# Patient Record
Sex: MALE | Race: WHITE | Employment: OTHER | URBAN - METROPOLITAN AREA
[De-identification: names, ages, dates, MRNs, and addresses within clinical notes are randomized per-mention and may not be internally consistent; named-entity substitution may affect disease eponyms.]

---

## 2018-01-15 NOTE — MISCELLANEOUS
Message    Date: 05/16/2016 03:15 PM  Pt called to say he is on Terbinafine 250 mg 1 po qd for toe fungus and has labs done recently  Pt states he lived in [place], [place] and told to call to see how his liver function is  He states the toe fungus is much better and almost cleared- should he continue med and for how long?   17 May 2016 12:21 PM   LM for patient with info that liver function is good and he should continue Terbinafine for another 2 to 3 months per Dr Trish Pierce instructions  I stated if he has any questions he should call us back  VR/vfp        Active Problems    1  Anxiety (300 00) (F41 9)   2  Colonoscopy (Fiberoptic) Screening   3  Decreased libido (799 81) (R68 82)   4  Depression (311) (F32 9)   5  Encounter for screening colonoscopy (V76 51) (Z12 11)   6  Essential hypertriglyceridemia (272 1) (E78 1)   7  Hypercholesterolemia (272 0) (E78 0)   8  Laceration of skin (879 8) (T14 8)   9  Obesity (278 00) (E66 9)   10  Onychomycosis (110 1) (B35 1)   11  Screening for colorectal cancer (V76 51) (Z12 11,Z12 12)   12  Screening PSA (prostate specific antigen) (V76 44) (Z12 5)    Current Meds   1  B-12 100 MCG Oral Tablet; Therapy: 66EIF3249 to Recorded   2  Clotrimazole-Betamethasone 1-0 05 % External Cream; APPLY  AND RUB  IN A THIN   FILM TO AFFECTED AREAS TWICE DAILY  (AM AND PM); Therapy: 71PTP6114 to (Last Rx:08Jun2015)  Requested for: 81LUI8848 Ordered   3  Fish Oil 1200 MG Oral Capsule; Therapy: 53LUV1714 to Recorded   4  Flax Oral Oil; Therapy: 58KBR5245 to Recorded   5  Multivitamins TABS; Therapy: 15FNK7721 to Recorded   6  Nystatin 253672 UNIT/GM External Cream; APPLY 2-3 TIMES DAILY TO AFFECTED   AREA(S); Therapy: 11RIH3996 to (Last Rx:08Jun2015)  Requested for: 33CUV5043 Ordered   7  Nystatin 669458 UNIT/GM External Cream; APPLY 2-3 TIMES DAILY TO AFFECTED   AREA(S); Therapy: 44UXE9993 to (Last Rx:08Jun2015)  Requested for: 38EZW8983 Ordered   8   Sertraline HCl - 50 MG Oral Tablet; TAKE 1 TABLET DAILY; Therapy: 23WWD6951 to (Evaluate:11Alv8920)  Requested for: 90KCI2942; Last   Rx:11Jan2016 Ordered   9  Sertraline HCl - 50 MG Oral Tablet; take 1 tablet every twelve hours; Therapy: 61FEA9070 to (Evaluate:20Lrf7640)  Requested for: 59XMV3639; Last   Rx:01Feb2016 Ordered   10  Silver Sulfadiazine 1 % External Cream; APPLY 1 INCH Daily; Therapy: 07EXL9494 to (Last Rx:11Jan2016)  Requested for: 83RPN0928 Ordered   11  Terbinafine HCl - 250 MG Oral Tablet; Take 1 tablet daily; Therapy: 15KYK3054 to (Danna Thurman)  Requested for: 13WAL3642; Last    Rx:01Feb2016 Ordered   12  Triamcinolone Acetonide 0 5 % External Cream; APPLY 2-3 TIMES DAILY TO    AFFECTED AREA(S); Therapy: 84CWT2012 to (Last Rx:80Ytf5046)  Requested for: 96ISI7335 Ordered   13  Viagra 100 MG Oral Tablet; TAKE 1 TABLET DAILY 1 HOUR BEFORE NEEDED; Therapy: 13XSD8538 to (Evaluate:56Jbz8856)  Requested for: 64NSU4056; Last    Rx:11Jan2016 Ordered   14  Vitamin C 500 MG Oral Tablet Recorded    Allergies    1   No Known Drug Allergies    Signatures   Electronically signed by : Shemar Reddy DO; May 17 2016  2:02PM EST                       (Author)

## 2021-04-19 DIAGNOSIS — E55.9 VITAMIN D DEFICIENCY: ICD-10-CM

## 2021-04-19 DIAGNOSIS — E78.00 HYPERCHOLESTEREMIA: ICD-10-CM

## 2021-04-19 DIAGNOSIS — Z12.5 PROSTATE CANCER SCREENING: Primary | ICD-10-CM

## 2021-04-21 LAB
25(OH)D3 SERPL-MCNC: 11 NG/ML (ref 30–100)
ALBUMIN SERPL-MCNC: 4.5 G/DL (ref 3.6–5.1)
ALBUMIN/GLOB SERPL: 1.6 (CALC) (ref 1–2.5)
ALP SERPL-CCNC: 89 U/L (ref 35–144)
ALT SERPL-CCNC: 36 U/L (ref 9–46)
AST SERPL-CCNC: 27 U/L (ref 10–35)
BASOPHILS # BLD AUTO: 50 CELLS/UL (ref 0–200)
BASOPHILS NFR BLD AUTO: 0.8 %
BILIRUB SERPL-MCNC: 0.9 MG/DL (ref 0.2–1.2)
BUN SERPL-MCNC: 23 MG/DL (ref 7–25)
BUN/CREAT SERPL: NORMAL (CALC) (ref 6–22)
CALCIUM SERPL-MCNC: 9.1 MG/DL (ref 8.6–10.3)
CHLORIDE SERPL-SCNC: 102 MMOL/L (ref 98–110)
CHOLEST SERPL-MCNC: 274 MG/DL
CHOLEST/HDLC SERPL: 4.1 (CALC)
CO2 SERPL-SCNC: 25 MMOL/L (ref 20–32)
CREAT SERPL-MCNC: 1.12 MG/DL (ref 0.7–1.25)
EOSINOPHIL # BLD AUTO: 112 CELLS/UL (ref 15–500)
EOSINOPHIL NFR BLD AUTO: 1.8 %
ERYTHROCYTE [DISTWIDTH] IN BLOOD BY AUTOMATED COUNT: 12.6 % (ref 11–15)
GLOBULIN SER CALC-MCNC: 2.8 G/DL (CALC) (ref 1.9–3.7)
GLUCOSE SERPL-MCNC: 98 MG/DL (ref 65–99)
HCT VFR BLD AUTO: 44.9 % (ref 38.5–50)
HDLC SERPL-MCNC: 67 MG/DL
HGB BLD-MCNC: 15.3 G/DL (ref 13.2–17.1)
LDLC SERPL CALC-MCNC: 185 MG/DL (CALC)
LYMPHOCYTES # BLD AUTO: 1265 CELLS/UL (ref 850–3900)
LYMPHOCYTES NFR BLD AUTO: 20.4 %
MCH RBC QN AUTO: 30.7 PG (ref 27–33)
MCHC RBC AUTO-ENTMCNC: 34.1 G/DL (ref 32–36)
MCV RBC AUTO: 90.2 FL (ref 80–100)
MONOCYTES # BLD AUTO: 508 CELLS/UL (ref 200–950)
MONOCYTES NFR BLD AUTO: 8.2 %
NEUTROPHILS # BLD AUTO: 4266 CELLS/UL (ref 1500–7800)
NEUTROPHILS NFR BLD AUTO: 68.8 %
NONHDLC SERPL-MCNC: 207 MG/DL (CALC)
PLATELET # BLD AUTO: 242 THOUSAND/UL (ref 140–400)
PMV BLD REES-ECKER: 10.4 FL (ref 7.5–12.5)
POTASSIUM SERPL-SCNC: 4.2 MMOL/L (ref 3.5–5.3)
PROT SERPL-MCNC: 7.3 G/DL (ref 6.1–8.1)
PSA SERPL-MCNC: 1.4 NG/ML
RBC # BLD AUTO: 4.98 MILLION/UL (ref 4.2–5.8)
SL AMB EGFR AFRICAN AMERICAN: 82 ML/MIN/1.73M2
SL AMB EGFR NON AFRICAN AMERICAN: 71 ML/MIN/1.73M2
SODIUM SERPL-SCNC: 139 MMOL/L (ref 135–146)
TRIGL SERPL-MCNC: 101 MG/DL
WBC # BLD AUTO: 6.2 THOUSAND/UL (ref 3.8–10.8)

## 2021-05-21 ENCOUNTER — OFFICE VISIT (OUTPATIENT)
Dept: FAMILY MEDICINE CLINIC | Facility: CLINIC | Age: 61
End: 2021-05-21
Payer: COMMERCIAL

## 2021-05-21 VITALS
TEMPERATURE: 98.9 F | BODY MASS INDEX: 38.04 KG/M2 | WEIGHT: 296.4 LBS | HEART RATE: 94 BPM | OXYGEN SATURATION: 98 % | DIASTOLIC BLOOD PRESSURE: 90 MMHG | HEIGHT: 74 IN | SYSTOLIC BLOOD PRESSURE: 138 MMHG

## 2021-05-21 DIAGNOSIS — Z00.00 HEALTH CARE MAINTENANCE: Primary | ICD-10-CM

## 2021-05-21 DIAGNOSIS — E55.9 VITAMIN D DEFICIENCY: ICD-10-CM

## 2021-05-21 DIAGNOSIS — R03.0 BORDERLINE HYPERTENSION: ICD-10-CM

## 2021-05-21 DIAGNOSIS — E78.00 HYPERCHOLESTEREMIA: ICD-10-CM

## 2021-05-21 PROCEDURE — 99386 PREV VISIT NEW AGE 40-64: CPT | Performed by: FAMILY MEDICINE

## 2021-05-21 RX ORDER — ASPIRIN 81 MG/1
81 TABLET ORAL DAILY
Start: 2021-05-21

## 2021-05-21 NOTE — PROGRESS NOTES
Chief Complaint   Patient presents with    Physical Exam     review BW    Well Check     Assessment/Plan:  Diet and Vitamin D  Weight loss  Need colonoscopy  BMI Counseling: Body mass index is 38 06 kg/m²  The BMI is above normal  Nutrition recommendations include reducing portion sizes, decreasing overall calorie intake, consuming healthier snacks, decreasing soda and/or juice intake, moderation in carbohydrate intake, increasing intake of lean protein and reducing intake of saturated fat and trans fat  Diagnoses and all orders for this visit:    Health care maintenance    Hypercholesteremia    Vitamin D deficiency          Subjective:      Patient ID: Abigail King is a 62 y o  male  Physical, review labs  FH: Father with MI in his 66's,  around 79 yo  History of colitis  SH: Neg tob, + [place]  The following portions of the patient's history were reviewed and updated as appropriate: allergies, current medications, past family history, past medical history, past social history, past surgical history and problem list     Review of Systems   Constitutional: Negative  HENT: Negative  Eyes: Negative  Respiratory: Negative  Cardiovascular: Negative  Gastrointestinal: Negative  Genitourinary: Negative  Musculoskeletal: Negative  Skin: Negative  Neurological: Negative  Psychiatric/Behavioral: Negative  Objective:      /88 (BP Location: Left arm, Patient Position: Sitting, Cuff Size: Large)   Pulse 94   Temp 98 9 °F (37 2 °C) (Temporal)   Ht 6' 2" (1 88 m)   Wt 134 kg (296 lb 6 4 oz)   SpO2 98%   BMI 38 06 kg/m²     No current outpatient medications on file  No Known Allergies    History reviewed  No pertinent surgical history  Physical Exam  Constitutional:       Appearance: He is well-developed  HENT:      Head: Normocephalic and atraumatic        Right Ear: Tympanic membrane, ear canal and external ear normal       Left Ear: Tympanic membrane, ear canal and external ear normal       Nose: Nose normal       Mouth/Throat:      Mouth: Mucous membranes are moist       Pharynx: Oropharynx is clear  Eyes:      Extraocular Movements: Extraocular movements intact  Conjunctiva/sclera: Conjunctivae normal       Pupils: Pupils are equal, round, and reactive to light  Neck:      Musculoskeletal: Normal range of motion and neck supple  Cardiovascular:      Rate and Rhythm: Normal rate and regular rhythm  Pulses: Normal pulses  Heart sounds: Normal heart sounds  Pulmonary:      Effort: Pulmonary effort is normal       Breath sounds: Normal breath sounds  Abdominal:      General: Abdomen is flat  Bowel sounds are normal       Palpations: Abdomen is soft  Genitourinary:     Prostate: Normal    Skin:     General: Skin is warm and dry  Neurological:      General: No focal deficit present  Mental Status: He is alert and oriented to person, place, and time  Deep Tendon Reflexes: Reflexes are normal and symmetric  Psychiatric:         Mood and Affect: Mood normal          Behavior: Behavior normal          Thought Content:  Thought content normal          Judgment: Judgment normal

## 2021-05-21 NOTE — LETTER
May 21, 2021     Patient: Michael Mendez   YOB: 1960           To Whom it May Concern:    Lyndon Snider is under my professional care  Due to present medical conditions, patient was strongly advised not to fly  If you have any questions or concerns, please don't hesitate to call  Sincerely,              Adrián STALEY

## 2021-08-23 ENCOUNTER — TELEPHONE (OUTPATIENT)
Dept: FAMILY MEDICINE CLINIC | Facility: CLINIC | Age: 61
End: 2021-08-23

## 2021-08-23 DIAGNOSIS — Z00.00 HEALTH CARE MAINTENANCE: Primary | ICD-10-CM

## 2021-08-23 NOTE — TELEPHONE ENCOUNTER
Patient called and asked for diagnosis codes for lab work that was ordered in April to be changed to health maintenance code   Please approve blood work